# Patient Record
(demographics unavailable — no encounter records)

---

## 2024-11-22 NOTE — ASSESSMENT
[FreeTextEntry1] : 55-year-old female here for first postop evaluation status post right thumb trigger release. Patient is doing well overall.  Denies any popping or clicking of the finger.  Denies any signs of infection.  Denies any active bleeding or discharge.  Denies any numbness or tingling.  Reports pain is well-controlled.    Physical examination of the right thumb Mild hand swelling Sutures removed Healed incision No evidence of infection.  No active bleeding or discharge.  No erythema. Mild tenderness at the surgical site Good finger ROM.  Sensation is intact distally.  Neurovascularly intact.  The patient's sutures were removed in the office today.  The surgical incision site is clean dry and intact and without any signs of infection. We discussed scar massage.  Encouraged gentle range of motion of the wrist and fingers.  Encouraged patient to work on making a full fist. Advised to refrain from heavy lifting/pulling/pushing until repeat evaluation. Patient understands that some residual pain, swelling, and numbness/tingling following surgery is normal and expected.  All questions and concerns addressed to patient's satisfaction. Patient expresses full understanding of treatment plan.